# Patient Record
Sex: FEMALE | Race: OTHER | Employment: UNEMPLOYED | URBAN - METROPOLITAN AREA
[De-identification: names, ages, dates, MRNs, and addresses within clinical notes are randomized per-mention and may not be internally consistent; named-entity substitution may affect disease eponyms.]

---

## 2017-10-17 ENCOUNTER — HOSPITAL ENCOUNTER (OUTPATIENT)
Dept: NEUROLOGY | Age: 42
Discharge: HOME | End: 2017-10-17
Attending: PSYCHIATRY & NEUROLOGY
Payer: MEDICAID

## 2017-10-17 DIAGNOSIS — G40.019 LOCALIZATION-RELATED IDIOPATHIC EPILEPSY AND EPILEPTIC SYNDROMES WITH SEIZURES OF LOCALIZED ONSET, INTRACTABLE, WITHOUT STATUS EPILEPTICUS (HCC): ICD-10-CM

## 2017-10-17 DIAGNOSIS — G25.0 ESSENTIAL TREMOR: ICD-10-CM

## 2017-10-17 DIAGNOSIS — G35 MULTIPLE SCLEROSIS (HCC): ICD-10-CM

## 2017-10-17 PROCEDURE — 95816 EEG AWAKE AND DROWSY: CPT

## 2017-10-17 PROCEDURE — 95819 EEG AWAKE AND ASLEEP: CPT

## 2017-10-17 NOTE — PROCEDURES
[place]    Name:  Ashlyn Hess  MR#:  [id number]  :  1975  Account #:  [de-identified]  Date of Adm:  10/17/2017  Date of Service:  10/17/2017      EEG #: [facility]     REFERRING: Loraine Bailon MD    CLINICAL: This is an apparently wakeful, drowsy, and sleep EEG on  this 59-year-old patient with an abnormal MRI scan and memory loss. MEDICATIONS: None were indicated. ELECTROENCEPHALOGRAM REPORT: The predominant wakeful  background consists of 10-20 microvolts, irregular but symmetrical, 7  to 8 Hz waves which attenuate well with eye opening. Bifrontal 3-5  microvolts, 16-2- Hz activity is identified which is also symmetrical in its  occurrence. During what appears to be drowsiness, the posterior rhythm consists of  a mixture of 10-15 microvolts, 6-7 and 20-30 microvolts, 4-5 Hz  activity. Central vertex sharp waves are identified which are  symmetrical in their occurrence. Step-flash photic stimulation causes driving between 3 and 18 flashes  per second. IMPRESSION: This is abnormal wakeful and drowsy  electroencephalogram due to the presence of generalized slow wave  activity indicative of a diffuse encephalopathic process. No epileptiform  or focal abnormality was identified.         MD TRACIE Eugene / Khalida Lee  D:  10/17/2017   16:54  T:  10/17/2017   18:27  Job #:  [id number]

## 2017-10-31 ENCOUNTER — HOSPITAL ENCOUNTER (OUTPATIENT)
Dept: LAB | Age: 42
Discharge: HOME | End: 2017-10-31

## 2017-10-31 PROCEDURE — 99001 SPECIMEN HANDLING PT-LAB: CPT | Performed by: PSYCHIATRY & NEUROLOGY
